# Patient Record
Sex: FEMALE | Race: WHITE
[De-identification: names, ages, dates, MRNs, and addresses within clinical notes are randomized per-mention and may not be internally consistent; named-entity substitution may affect disease eponyms.]

---

## 2018-04-23 ENCOUNTER — HOSPITAL ENCOUNTER (OUTPATIENT)
Dept: HOSPITAL 33 - SDC | Age: 43
Discharge: HOME | End: 2018-04-23
Attending: SURGERY
Payer: COMMERCIAL

## 2018-04-23 VITALS — HEART RATE: 64 BPM | SYSTOLIC BLOOD PRESSURE: 118 MMHG | DIASTOLIC BLOOD PRESSURE: 81 MMHG

## 2018-04-23 VITALS — OXYGEN SATURATION: 95 %

## 2018-04-23 DIAGNOSIS — K22.2: ICD-10-CM

## 2018-04-23 DIAGNOSIS — K29.00: ICD-10-CM

## 2018-04-23 DIAGNOSIS — R13.10: Primary | ICD-10-CM

## 2018-04-23 PROCEDURE — 0D718ZZ DILATION OF UPPER ESOPHAGUS, VIA NATURAL OR ARTIFICIAL OPENING ENDOSCOPIC: ICD-10-PCS | Performed by: SURGERY

## 2018-04-23 PROCEDURE — 0DB78ZX EXCISION OF STOMACH, PYLORUS, VIA NATURAL OR ARTIFICIAL OPENING ENDOSCOPIC, DIAGNOSTIC: ICD-10-PCS | Performed by: SURGERY

## 2018-04-23 PROCEDURE — 0DB38ZX EXCISION OF LOWER ESOPHAGUS, VIA NATURAL OR ARTIFICIAL OPENING ENDOSCOPIC, DIAGNOSTIC: ICD-10-PCS | Performed by: SURGERY

## 2018-04-23 NOTE — HP
DATE OF SURGERY: 2018



HISTORY OF PRESENT ILLNESS:  The patient is a 42 year-old for a while but worse now feels 
like sensation there all the time now. Anti-reflux medication did not help much.  Stopped 
coffee, wine, carbonated beverages. Upper esophagus feeling like a pill is stuck in her 
upper esophagus. 



PAST MEDICAL HISTORY:  She denies any chronic illnesses. 



PAST SURGICAL HISTORY:  section, hysterectomy. 



MEDICATIONS:  She has been on an acid blocker and Estradiol. 



ALLERGIES:  NKDA.

 

FAMILY HISTORY:  Negative for esophageal cancer.



SOCIAL HISTORY:  No smoking. Reports social alcohol use denies abuse. 



REVIEW OF SYSTEMS:  Twelve systems reviewed per admission assessment. No chest pain or 
palpitations other systems negative or noncontributory as above and per preadmission 
questionnaire. 



PHYSICAL EXAMINATION:  

GENERAL:  No acute distress.

HEENT: Sclerae nonicteric.

NECK:  No JVD.

CHEST: Clear to auscultation.  

CVS:  Regular rate and rhythm. 

ABDOMEN:  Soft. No peritoneal signs.   

EXTREMITIES:  No significant edema.

NEURO:  Alert, oriented, moving extremities symmetrically. No gross motor deficits noted.

 

IMPRESSION:  Dysphagia with increasing reflux. I feel the patient would benefit from upper 
endoscopy possible biopsy, possible dilatation. Risks and benefits explained in detail but 
not limited to. She was shown the risk sheet and explained the procedure in detail but not 
limited to bleeding or infection, risk of bowel injury or perforation possibly requiring 
open procedure, risk of missed or nondiagnosis or incomplete exam possibly requiring other 
studies. She also understands possibility dilatation may not improve her symptoms that she 
may need further work up and/or testing. She also understands if dilatation does improve 
sometimes that needs repeated again in the future. She understands all the above, will 
proceed with EGD, possible biopsy, possible dilatation as an outpatient.

## 2018-04-24 NOTE — OP
SURGERY DATE/TIME:    04/23/2018  1020



PREOPERATIVE DIAGNOSIS:    Dysphagia. 



POSTOPERATIVE DIAGNOSES: 

1) Symptomatic proximal esophageal narrowing without any signs of any mass lesion.  

2) Mild erosive gastritis. 

 

PROCEDURES:     

1) EGD with cold biopsy of the antrum to evaluate for Helicobacter pylori. 

2) Cold biopsy distal esophagus to evaluate for very short segment of early distal 
gastroesophagitis versus early Noel's.

3) Proximal esophageal dilatation (size 20 balloon dilator). 



SURGEON:        Dr. Chad Byrd.



ANESTHESIA:    MAC.



ESTIMATED BLOOD LOSS:    Minimal.    



INDICATIONS:  As noted above. Risks and benefits explained in detail and not limited to 
and consent obtained.     

     

DESCRIPTION OF PROCEDURE AND FINDINGS: The patient is taken to the operating room. MAC 
anesthesia was introduced. After official time out and no disagreement with planned 
procedure, bite block positioned. Video gastroscope passed down the oropharynx. There as a 
proximal esophageal narrowing or spasm. The scope was able to be just passed down through 
this area down through the gastroesophageal junction about 38 cm. In the stomach the 
pylorus, junction of second and third portion of the duodenum on withdrawal of the scope 
proximal duodenum was grossly unremarkable. The scope pulled back in the stomach. She had 
some mild erosive gastritis with some very superficial linear erosion. A cold biopsy taken 
to evaluate for Helicobacter pylori. Good hemostasis noted. Otherwise on retroflex the 
gastroesophageal junction is fairly snug against the scope. There was no signs of any 
large hiatal hernia. The scope was pulled back to the distal esophagus where a very short 
segment of either very early inflammation versus normal variation of the gastroesophageal 
junction versus very early Noel's. A cold biopsy is taken. Good hemostasis noted. 
Otherwise the scope pulled back up to the proximal esophageal narrowing this was carefully 
inspected a couple times through the area. There were no signs of any obvious mass 
lesions. Biopsies given she was having her symptoms here. It was felt this warranted 
balloon dilatation. The scope is passed back down in the stomach. Balloon catheter 
carefully inserted under direct vision in the stomach and then pulled back up to the 
proximal esophageal narrowing. It was then gradually inflated to first stage for about 45 
seconds, second stage for about 45 seconds and final stage for 2 minutes. The balloon 
catheter is then decompressed and withdrawn. The scope much more easily passed through 
this area that had been dilated. There were no signs of any full thickness issues or 
injury. The scope passed back down in the stomach. The biopsy sites had good hemostasis. 
The scope was carefully withdrawn. Again, there were no signs of any full thickness issues 
secondary to balloon dilatation. The patient tolerated the procedure well. Findings 
discussed with the family out in the waiting area.